# Patient Record
Sex: FEMALE | Race: WHITE | Employment: FULL TIME | URBAN - METROPOLITAN AREA
[De-identification: names, ages, dates, MRNs, and addresses within clinical notes are randomized per-mention and may not be internally consistent; named-entity substitution may affect disease eponyms.]

---

## 2021-09-29 ENCOUNTER — OFFICE VISIT (OUTPATIENT)
Dept: OBGYN | Age: 31
End: 2021-09-29
Payer: COMMERCIAL

## 2021-09-29 ENCOUNTER — HOSPITAL ENCOUNTER (OUTPATIENT)
Age: 31
Setting detail: SPECIMEN
Discharge: HOME | End: 2021-09-29
Payer: COMMERCIAL

## 2021-09-29 VITALS
WEIGHT: 250 LBS | SYSTOLIC BLOOD PRESSURE: 140 MMHG | HEART RATE: 73 BPM | HEIGHT: 63 IN | DIASTOLIC BLOOD PRESSURE: 88 MMHG | BODY MASS INDEX: 44.3 KG/M2

## 2021-09-29 DIAGNOSIS — N92.1 MENORRHAGIA WITH IRREGULAR CYCLE: ICD-10-CM

## 2021-09-29 DIAGNOSIS — Z01.419 WOMEN'S ANNUAL ROUTINE GYNECOLOGICAL EXAMINATION: Primary | ICD-10-CM

## 2021-09-29 LAB
BASOPHILS ABSOLUTE: 0.1 K/UL (ref 0–0.2)
BASOPHILS RELATIVE PERCENT: 0.7 %
EOSINOPHILS ABSOLUTE: 0.6 K/UL (ref 0–0.6)
EOSINOPHILS RELATIVE PERCENT: 6.7 %
FOLLICLE STIMULATING HORMONE: 5.7 MIU/ML
HCT VFR BLD CALC: 48.6 % (ref 36–48)
HEMOGLOBIN: 15.8 G/DL (ref 12–16)
LUTEINIZING HORMONE: 15 MIU/ML
LYMPHOCYTES ABSOLUTE: 3.2 K/UL (ref 1–5.1)
LYMPHOCYTES RELATIVE PERCENT: 34.2 %
MCH RBC QN AUTO: 26.7 PG (ref 26–34)
MCHC RBC AUTO-ENTMCNC: 32.6 G/DL (ref 31–36)
MCV RBC AUTO: 82.1 FL (ref 80–100)
MONOCYTES ABSOLUTE: 0.5 K/UL (ref 0–1.3)
MONOCYTES RELATIVE PERCENT: 5.1 %
NEUTROPHILS ABSOLUTE: 4.9 K/UL (ref 1.7–7.7)
NEUTROPHILS RELATIVE PERCENT: 53.3 %
PDW BLD-RTO: 14.1 % (ref 12.4–15.4)
PLATELET # BLD: 255 K/UL (ref 135–450)
PMV BLD AUTO: 9 FL (ref 5–10.5)
PROLACTIN: 8.2 NG/ML
RBC # BLD: 5.92 M/UL (ref 4–5.2)
TSH REFLEX: 0.73 UIU/ML (ref 0.27–4.2)
WBC # BLD: 9.3 K/UL (ref 4–11)

## 2021-09-29 PROCEDURE — 87801 DETECT AGNT MULT DNA AMPLI: CPT

## 2021-09-29 PROCEDURE — 87624 HPV HI-RISK TYP POOLED RSLT: CPT

## 2021-09-29 PROCEDURE — 99385 PREV VISIT NEW AGE 18-39: CPT | Performed by: OBSTETRICS & GYNECOLOGY

## 2021-09-29 PROCEDURE — 88142 CYTOPATH C/V THIN LAYER: CPT

## 2021-09-29 PROCEDURE — 36415 COLL VENOUS BLD VENIPUNCTURE: CPT | Performed by: OBSTETRICS & GYNECOLOGY

## 2021-09-29 RX ORDER — SERTRALINE HYDROCHLORIDE 100 MG/1
100 TABLET, FILM COATED ORAL DAILY
COMMUNITY

## 2021-09-29 RX ORDER — MULTIVIT-MIN/IRON/FOLIC ACID/K 18-600-40
CAPSULE ORAL
COMMUNITY

## 2021-09-29 SDOH — ECONOMIC STABILITY: FOOD INSECURITY: WITHIN THE PAST 12 MONTHS, THE FOOD YOU BOUGHT JUST DIDN'T LAST AND YOU DIDN'T HAVE MONEY TO GET MORE.: NEVER TRUE

## 2021-09-29 SDOH — ECONOMIC STABILITY: FOOD INSECURITY: WITHIN THE PAST 12 MONTHS, YOU WORRIED THAT YOUR FOOD WOULD RUN OUT BEFORE YOU GOT MONEY TO BUY MORE.: NEVER TRUE

## 2021-09-29 ASSESSMENT — SOCIAL DETERMINANTS OF HEALTH (SDOH): HOW HARD IS IT FOR YOU TO PAY FOR THE VERY BASICS LIKE FOOD, HOUSING, MEDICAL CARE, AND HEATING?: NOT HARD AT ALL

## 2021-09-29 ASSESSMENT — PATIENT HEALTH QUESTIONNAIRE - PHQ9
1. LITTLE INTEREST OR PLEASURE IN DOING THINGS: 0
SUM OF ALL RESPONSES TO PHQ QUESTIONS 1-9: 0
SUM OF ALL RESPONSES TO PHQ9 QUESTIONS 1 & 2: 0
2. FEELING DOWN, DEPRESSED OR HOPELESS: 0

## 2021-09-29 NOTE — PROGRESS NOTES
21    Guillermo Connell  1990  As she had regular cycles. Chief Complaint   Patient presents with   [facility] Gynecologic Exam     Annual exam, sexually active, LMP 9/3/21 that lasted until 21, prior to that some spotting off and on x6 mths, pt desires to conceive x 8yrs. pap 2015 negative . ijeoma Connell is a 28 y.o. female who presents today for evaluation of irregular periods and infertility. She states she has been trying to conceive for 8 years. Her partner has fathered children. She is also had a longstanding history of irregular cycles. She states at no point in her life as she had regular cycles. Past Medical History:   Diagnosis Date    Anxiety     Anxiety     Infertility associated with anovulation     Irregular menses     Obesity     PCOS (polycystic ovarian syndrome)     PCOS (polycystic ovarian syndrome)        Past Surgical History:   Procedure Laterality Date    HAND SURGERY         Social History     Tobacco Use    Smoking status: Current Every Day Smoker     Packs/day: 0.50     Types: Cigarettes    Smokeless tobacco: Former User   Vaping Use    Vaping Use: Never used   Substance Use Topics    Alcohol use: Yes     Comment: social    Drug use: No       Family History   Problem Relation Age of Onset    Breast Cancer Paternal Grandmother     Hypertension Father        Current Outpatient Medications   Medication Sig Dispense Refill    sertraline (ZOLOFT) 100 MG tablet Take 100 mg by mouth daily      Cholecalciferol (VITAMIN D) 50 MCG (2000) CAPS capsule Take by mouth       No current facility-administered medications for this visit. Allergies   Allergen Reactions    Bactrim [Sulfamethoxazole-Trimethoprim] Hives           Immunization History   Administered Date(s) Administered    COVID-19, Pfizer, PF, 30mcg/0.3mL 2021, 2021       Review of Systems   All other systems reviewed and are negative.       BP (!) 140/88   Pulse 73   Ht 5' 3\" (1.6 m)   Wt 250 lb (113.4 kg)   LMP 09/03/2021 (Exact Date)   BMI 44.29 kg/m²     Physical Exam  Nursing note reviewed. HENT:      Head: Normocephalic. Nose: Nose normal.   Eyes:      Extraocular Movements: Extraocular movements intact. Pulmonary:      Effort: Pulmonary effort is normal.   Abdominal:      General: Abdomen is flat. Palpations: Abdomen is soft. Musculoskeletal:      Cervical back: Normal range of motion. Skin:     General: Skin is warm. Neurological:      Mental Status: She is alert. No results found for this visit on 09/29/21. ASSESSMENT AND PLAN   Diagnosis Orders   1. Women's annual routine gynecological examination  CBC Auto Differential    Vaginal Pathogens Probes *A    DHEA-Sulfate    Insulin, total    Prolactin    TSH with Reflex    Sex Hormone Binding Globulin    Testosterone, free, total    Follicle Stimulating Hormone    Luteinizing Hormone    PAP SMEAR    C.trachomatis N.gonorrhoeae DNA, Thin Prep   2. Menorrhagia with irregular cycle  CBC Auto Differential    Vaginal Pathogens Probes *A    DHEA-Sulfate    Insulin, total    Prolactin    TSH with Reflex    Sex Hormone Binding Globulin    Testosterone, free, total    Follicle Stimulating Hormone    Luteinizing Hormone    PAP SMEAR    C.trachomatis N.gonorrhoeae DNA, Thin Prep    US NON OB TRANSVAGINAL       Return in about 1 week (around 10/6/2021) for follow up appointment, ultrasound.     Gabi Morales MD

## 2021-09-30 LAB
CANDIDA SPECIES, DNA PROBE: NORMAL
GARDNERELLA VAGINALIS, DNA PROBE: NORMAL
TRICHOMONAS VAGINALIS DNA: NORMAL

## 2021-10-01 LAB
DHEAS (DHEA SULFATE): 136 UG/DL (ref 45–270)
INSULIN COMMENT: NORMAL
INSULIN REFERENCE RANGE:: NORMAL
INSULIN: 31.6 MU/L
SEX HORMONE BINDING GLOBULIN: 28 NMOL/L (ref 30–135)
SEX HORMONE BINDING GLOBULIN: 28 NMOL/L (ref 30–135)
TESTOSTERONE FREE-NONMALE: 10.9 PG/ML (ref 0.8–7.4)
TESTOSTERONE TOTAL: 55 NG/DL (ref 20–70)

## 2021-10-05 LAB
CHLAMYDIA BY THIN PREP: NEGATIVE
GC BY THIN PREP: NEGATIVE
HPV HIGH RISK: NOT DETECTED
HPV, GENOTYPE 16: NOT DETECTED
HPV, GENOTYPE 18: NOT DETECTED

## 2021-10-13 ENCOUNTER — OFFICE VISIT (OUTPATIENT)
Dept: OBGYN | Age: 31
End: 2021-10-13
Payer: COMMERCIAL

## 2021-10-13 VITALS
WEIGHT: 250 LBS | DIASTOLIC BLOOD PRESSURE: 84 MMHG | BODY MASS INDEX: 44.3 KG/M2 | HEIGHT: 63 IN | HEART RATE: 76 BPM | SYSTOLIC BLOOD PRESSURE: 135 MMHG

## 2021-10-13 DIAGNOSIS — N92.1 MENORRHAGIA WITH IRREGULAR CYCLE: Primary | ICD-10-CM

## 2021-10-13 DIAGNOSIS — E28.2 PCOS (POLYCYSTIC OVARIAN SYNDROME): ICD-10-CM

## 2021-10-13 PROCEDURE — 99214 OFFICE O/P EST MOD 30 MIN: CPT | Performed by: OBSTETRICS & GYNECOLOGY

## 2021-10-13 RX ORDER — MEDROXYPROGESTERONE ACETATE 10 MG/1
10 TABLET ORAL DAILY
Qty: 30 TABLET | Refills: 3 | Status: SHIPPED | OUTPATIENT
Start: 2021-10-13 | End: 2021-10-23

## 2021-10-13 NOTE — PROGRESS NOTES
10/13/21    Angy Orellana  1990    Chief Complaint   Patient presents with    Follow-up     had u/s     Menorrhagia    Menstrual Problem        Angy Orellana is a 28 y.o. female who presents today for evaluation of her irregular cycles and desire to conceive. Ultrasound and labs were reviewed with the patient. Patient reports only having 2 cycles this year so far. No other complaints or problems. Past Medical History:   Diagnosis Date    Anxiety     Anxiety     Infertility associated with anovulation     Irregular menses     Obesity     PCOS (polycystic ovarian syndrome)     PCOS (polycystic ovarian syndrome)        Past Surgical History:   Procedure Laterality Date    HAND SURGERY         Social History     Tobacco Use    Smoking status: Current Every Day Smoker     Packs/day: 0.50     Types: Cigarettes    Smokeless tobacco: Former User   Vaping Use    Vaping Use: Never used   Substance Use Topics    Alcohol use: Yes     Comment: social    Drug use: No       Family History   Problem Relation Age of Onset    Breast Cancer Paternal Grandmother     Hypertension Father        Current Outpatient Medications   Medication Sig Dispense Refill    medroxyPROGESTERone (PROVERA) 10 MG tablet Take 1 tablet by mouth daily for 10 days 30 tablet 3    clomiPHENE (CLOMID) 50 MG tablet Take 1 tablet by mouth daily On days 3-7 of cycle 30 tablet 3    sertraline (ZOLOFT) 100 MG tablet Take 100 mg by mouth daily      Cholecalciferol (VITAMIN D) 50 MCG (2000 UT) CAPS capsule Take by mouth       No current facility-administered medications for this visit. Allergies   Allergen Reactions    Bactrim [Sulfamethoxazole-Trimethoprim] Hives           Immunization History   Administered Date(s) Administered    COVID-19, Pfizer, PF, 30mcg/0.3mL 2021, 2021       Review of Systems   All other systems reviewed and are negative.       /84   Pulse 76   Ht 5' 3\" (1.6 m)   Wt 250 lb (113.4 kg)   BMI 44.29 kg/m²     Physical Exam    No results found for this visit on 10/13/21. ASSESSMENT AND PLAN   Diagnosis Orders   1. Menorrhagia with irregular cycle     2. PCOS (polycystic ovarian syndrome)       Prescriptions for Provera 10 mg daily for 10 days. She was also given a prescription for Clomid 50 mg 1 p.o. daily on days 3 through 7. Ovulation induction information sheet was given to the patient. She was instructed to follow-up with pregnancy or in 4 months. Return in about 4 months (around 2/13/2022) for follow up appointment.     Tani Murray MD

## 2022-02-16 ENCOUNTER — OFFICE VISIT (OUTPATIENT)
Dept: OBGYN | Age: 32
End: 2022-02-16
Payer: COMMERCIAL

## 2022-02-16 VITALS
SYSTOLIC BLOOD PRESSURE: 131 MMHG | DIASTOLIC BLOOD PRESSURE: 82 MMHG | WEIGHT: 248 LBS | HEIGHT: 63 IN | BODY MASS INDEX: 43.94 KG/M2 | HEART RATE: 99 BPM

## 2022-02-16 DIAGNOSIS — E28.2 PCOS (POLYCYSTIC OVARIAN SYNDROME): Primary | ICD-10-CM

## 2022-02-16 PROCEDURE — 99213 OFFICE O/P EST LOW 20 MIN: CPT | Performed by: OBSTETRICS & GYNECOLOGY

## 2022-02-16 NOTE — PROGRESS NOTES
22    Kathia Garcia  1990    Chief Complaint   Patient presents with    Menstrual Problem     pt has PCOS trying to conceive, LMP 22 states her menses in . is the first one she got without taking the provera . on clomid . ijeoma Garcia is a 33 y.o. female who presents today for evaluation of PCOS and irregular cycles. She is currently on Clomid. She states her home ovulation predictor kits are turning positive and she had a spontaneous cycle last month. Past Medical History:   Diagnosis Date    Anxiety     Anxiety     Infertility associated with anovulation     Irregular menses     Obesity     PCOS (polycystic ovarian syndrome)     PCOS (polycystic ovarian syndrome)        Past Surgical History:   Procedure Laterality Date    HAND SURGERY         Social History     Tobacco Use    Smoking status: Current Every Day Smoker     Packs/day: 0.50     Types: Cigarettes    Smokeless tobacco: Former User   Vaping Use    Vaping Use: Never used   Substance Use Topics    Alcohol use: Yes     Comment: social    Drug use: No       Family History   Problem Relation Age of Onset    Breast Cancer Paternal Grandmother     Hypertension Father        Current Outpatient Medications   Medication Sig Dispense Refill    clomiPHENE (CLOMID) 50 MG tablet Take 1 tablet by mouth daily On days 3-7 of cycle 30 tablet 3    sertraline (ZOLOFT) 100 MG tablet Take 100 mg by mouth daily      Cholecalciferol (VITAMIN D) 50 MCG ( UT) CAPS capsule Take by mouth      medroxyPROGESTERone (PROVERA) 10 MG tablet Take 1 tablet by mouth daily for 10 days 30 tablet 3     No current facility-administered medications for this visit.        Allergies   Allergen Reactions    Bactrim [Sulfamethoxazole-Trimethoprim] Hives           Immunization History   Administered Date(s) Administered    COVID-19, Pfizer Purple top, DILUTE for use, 12+ yrs, 30mcg/0.3mL dose 2021, 2021, 2021 Review of Systems  All other systems reviewed and are negative    /82   Pulse 99   Ht 5' 3\" (1.6 m)   Wt 248 lb (112.5 kg)   LMP 01/24/2022   BMI 43.93 kg/m²     Physical Exam    No results found for this visit on 02/16/22. ASSESSMENT AND PLAN   Diagnosis Orders   1. PCOS (polycystic ovarian syndrome)       Patient will continue on Clomid for four more cycles. If she is not pregnant she will return for hysterosalpingogram and semen analysis. She will call and return if she has a positive pregnancy test.  Return if symptoms worsen or fail to improve.     Ana Cristina Park MD

## 2022-07-18 ENCOUNTER — HOSPITAL ENCOUNTER (OUTPATIENT)
Dept: MRI IMAGING | Age: 32
Discharge: HOME | End: 2022-07-18
Payer: COMMERCIAL

## 2022-07-18 DIAGNOSIS — S83.91XA SPRAIN OF UNSPECIFIED SITE OF RIGHT KNEE, INITIAL ENCOUNTER: ICD-10-CM

## 2022-07-18 PROCEDURE — 73721 MRI JNT OF LWR EXTRE W/O DYE: CPT

## 2022-07-23 ENCOUNTER — HOSPITAL ENCOUNTER (OUTPATIENT)
Dept: PHYSICAL THERAPY | Age: 32
Setting detail: THERAPIES SERIES
Discharge: HOME | End: 2022-07-23
Payer: COMMERCIAL

## 2022-07-23 PROCEDURE — 97110 THERAPEUTIC EXERCISES: CPT

## 2022-07-23 PROCEDURE — 97161 PT EVAL LOW COMPLEX 20 MIN: CPT

## 2022-07-23 PROCEDURE — 97535 SELF CARE MNGMENT TRAINING: CPT

## 2022-07-23 PROCEDURE — 97112 NEUROMUSCULAR REEDUCATION: CPT

## 2022-07-23 ASSESSMENT — PAIN SCALES - GENERAL: PAINLEVEL_OUTOF10: 2

## 2022-07-23 ASSESSMENT — PAIN - FUNCTIONAL ASSESSMENT: PAIN_FUNCTIONAL_ASSESSMENT: PREVENTS OR INTERFERES SOME ACTIVE ACTIVITIES AND ADLS

## 2022-07-23 ASSESSMENT — PAIN DESCRIPTION - LOCATION: LOCATION: KNEE

## 2022-07-23 ASSESSMENT — PAIN DESCRIPTION - ORIENTATION: ORIENTATION: RIGHT

## 2022-07-23 ASSESSMENT — PAIN DESCRIPTION - DESCRIPTORS: DESCRIPTORS: TIGHTNESS

## 2022-07-23 ASSESSMENT — PAIN DESCRIPTION - PAIN TYPE: TYPE: CHRONIC PAIN

## 2022-07-23 ASSESSMENT — PAIN DESCRIPTION - FREQUENCY: FREQUENCY: INTERMITTENT

## 2022-07-23 NOTE — FLOWSHEET NOTE
from physical therapy to address deficits limiting mobility and function. Subjective:  See eval         Any changes in Ambulatory Summary Sheet? None        Objective:  See eval   COVID screening questions were asked and patient attested that there had been no contact or symptoms        Exercises: (No more than 4 columns)   Exercise/Equipment Date 7/23/22  #1 Date Date           WARM UP                     TABLE      *Quad sets 1 x 10  Vc/tc     *SLR 1 x 10  vc     *Hip add yan 1 x 10                    STANDING      PREs wbing/closed chain                                               PROPRIOCEPTION                                    MODALITIES prn                      Other Therapeutic Activities/Education:    POC and treatment rationale/progression expected reviewed w/patient. Home Exercise Program:    See above ()    Manual Treatments:    NO    Modalities:    NO    Communication with other providers:    POC faxed 7/23/22    Assessment:  (Response towards treatment session) (Pain Rating)  Assessment: Pt is a 33 y.o. patient who reports twisting her knee while using a antonino lift at work back in April. Per pt, there has been some improvement, but still gives her pain. WORSE:  Standing/walking for a long period, swells by night time, kneeling. BETTER:  In am and when wearing brace. Pain and swelling are intermittent. PLOF:  No knee issues, independent w/all mobility, self-care, work w/o problem. CURRENT OF LOF:  R knee pain worsening through the day, swells, difficult to walk/stand for long periods of time. Patient will benefit from physical therapy to address deficits limiting mobility and function.       Plan for Next Session:   Specific Instructions for Next Treatment: STM, KT tape for PF syndrome, quad/VMO strengthening, modalities prn, HEP    Time In / Time Out:    5600/8290       If [facility] Please Indicate Time In/Out/Total Time  CPT Code Time in Time out Total Time   83018  0800  0815  [address]  10                        Total for session      55       Timed Code/Total Treatment Minutes:  40'/55'  TE 13' (1), NRE 13' (1), ADL 10' (1)      Next Progress Note due:  8/23/22      Plan of Care Interventions:  [x] Therapeutic Exercise  [x] Modalities:  [x] Therapeutic Activity     [x] Ultrasound  [x] Estim  [] Gait Training      [] Cervical Traction [] Lumbar Traction  [x] Neuromuscular Re-education    [x] Cold/hotpack [] Iontophoresis   [x] Instruction in HEP      [x] Vasopneumatic   [] Dry Needling    [x] Manual Therapy               [] Aquatic Therapy              Electronically signed by:  Darin Sanchez, PT, 7/23/2022, 2:59 PM

## 2022-07-23 NOTE — PROGRESS NOTES
Physical Therapy  Physical Therapy: Initial Evaluation    Patient: Donte Avila (24 y.o. female)   Examination Date:   Plan of Care Certification Period: 2022 to  22      :  1990 ;    Confirmed: Yes MRN: [id number]  CSN: [id number]   Insurance: Payor: Kai Wren / Plan: [place] [id number] / Product Type: *No Product type* /   Insurance ID: [id number] - (Worker's Comp) Secondary Insurance (if applicable):    Referring Physician: FRANCIS Howard   PCP: Bo Perez MD Visits to Date/Visits Approved:   1/ 10      No Show/Cancelled Appts:   /       Medical Diagnosis: Sprain of unspecified site of right knee, initial encounter [S83.91XA] R knee sprain  Treatment Diagnosis: L knee pain, impaired function     PERTINENT MEDICAL HISTORY   Patient Assessed for Rehabilitation Services: Yes  Self reported health status[de-identified] Fair    Medical History: Chart Reviewed: Yes   Past Medical History:   Diagnosis Date    Anxiety     Anxiety     Infertility associated with anovulation     Irregular menses     Obesity     PCOS (polycystic ovarian syndrome)     PCOS (polycystic ovarian syndrome)      Surgical History:   Past Surgical History:   Procedure Laterality Date    HAND SURGERY         Medications:   Current Outpatient Medications:     medroxyPROGESTERone (PROVERA) 10 MG tablet, Take 1 tablet by mouth daily for 10 days, Disp: 30 tablet, Rfl: 3    clomiPHENE (CLOMID) 50 MG tablet, Take 1 tablet by mouth daily On days 3-7 of cycle, Disp: 30 tablet, Rfl: 3    sertraline (ZOLOFT) 100 MG tablet, Take 100 mg by mouth daily, Disp: , Rfl:     Cholecalciferol (VITAMIN D) 50 MCG (2000) CAPS capsule, Take by mouth, Disp: , Rfl:   Allergies: Bactrim [sulfamethoxazole-trimethoprim]      SUBJECTIVE EXAMINATION     History obtained from[de-identified] Patient,      Family/Caregiver Present: No    Subjective History:  Onset Date: 22  Subjective: Pt is a 33 y.o. patient who reports twisting her knee while using a antonino lift at work back in April. Per pt, there has been some improvement, but still gives her pain. WORSE:  Standing/walking for a long period, swells by night time, kneeling. BETTER:  In am and when wearing brace. Pain and swelling are intermittent. PLOF:  No knee issues, independent w/all mobility, self-care, work w/o problem. CURRENT OF LOF:  R knee pain worsening through the day, swells, difficult to walk/stand for long periods of time.   Additional Pertinent Hx (if applicable): PMH:  anxiety, obesity   Prior diagnostic testing[de-identified] MRI  Previous treatments prior to current episode?: Chiropractor  Comment: No latex allergery as far as she knows      Learning/Language: Learning  Does the patient/guardian have any barriers to learning?: No barriers  Will there be a co-learner?: No  What is the preferred language of the patient/guardian?: English     Pain Screening   Pain Screening  Patient Currently in Pain: Yes  Pain Assessment: 0-10  Pain Level: 2  Best Pain Level: 0  Worst Pain Level: 7  Patient's Stated Pain Goal: 0 - No pain  Pain Type: Chronic pain  Pain Location: Knee  Pain Orientation: Right  Pain Descriptors: Tightness  Pain Frequency: Intermittent  Functional Pain Assessment: Prevents or interferes some active activities and ADLs  Aggravating factors: Walking, Standing, Kneeling  Pain Management/Relieving Factors: Rest, Ice, Sitting    Functional Status    Dominant Hand: : Right    Social History:  Social History  Lives With: Spouse  Type of Home: House  Home Layout: One level, Laundry in basement  Home Access:  (5 dionne w/rail)    Occupation/Interests:  Occupation: Full time employment  Type of Occupation: [employer] at [employer]: Prolonged standing, Moderate lifting    Prior Level of Function:  Ind w/mobility          Current Level of Function:  Ind w/mobility knee pain      ADL Assistance: [facility]: Independent  Homemaking Responsibilities: Yes  Ambulation Assistance: Independent  Transfer Assistance: Independent  Active : Yes  Mode of Transportation: Car    OBJECTIVE EXAMINATION   Restrictions:        Position Activity Restriction  Other position/activity restrictions: No formal restrictions    Review of Systems:  Vision: Within Functional Limits  Hearing: Within functional limits  Patient affect[de-identified] Normal  Follows Commands: Within Functional Limits    VBI Screening / Lumbar Screening:        Regional Screen:         Observations:  General Observations  General Observations: Yes  Description: gait w/o AD, non-antalgic, able to ascend/descend stairs    Palpation:   Right Knee Palpation: TTP infero-medial border of patella, soft tissue snapping w/med and lateral rocking of patells    Ambulation/Gait (if applicable):  Ambulation  Surface: level tile, carpet  Device: No Device  Assistance: Independent  Gait Deviations: None  Distance: 100 ft x 2  Stairs/Curb  Stairs?: Yes  Stairs  # Steps : 11  Stairs Height: 6\"  Assistance: Modified independent   Comment: reciprocol, non-antalgic    Left LE AROM  Right LE AROM      General AROM LE: Left WFL (genu valgus bilat)    General AROM LE: Left WFL (genu valgus bilat)  AROM RLE (degrees)  R Knee Flexion 0-145: 0-130  R Knee Extension 0: 0     Left PROM  Right PROM                    Left Strength  Right Strength         Strength LLE  L Hip Flexion: 5/5  L Hip Extension: 5/5  L Knee Flexion: 5/5  L Knee Extension: 5/5    Strength RLE  R Hip Flexion: 4+/5  R Hip Extension: 4+/5  R Knee Flexion: 4/5  R Knee Extension: 4/5     Special Tests:   Special Tests for Knee  Fisher's (chondromalacia Patellae): R (-) (however, apprehensive)  Valgus Stress Test (MCL injury): R (-)  Varus Stress Test (LCL injury): R (-)  Posterior Draw Test (PCL injury): R (-)  Ant.  Drawer (ACL injury): R (-)  Jermaine (Meniscus Lesion): R (-)    Additional Finding(s) (if applicable):    KOOS = 3/67 (32% disability)       ASSESSMENT Impression: Assessment: Pt is a 33 y.o. patient who reports twisting her knee while using a antonino lift at work back in April. Per pt, there has been some improvement, but still gives her pain. WORSE:  Standing/walking for a long period, swells by night time, kneeling. BETTER:  In am and when wearing brace. Pain and swelling are intermittent. PLOF:  No knee issues, independent w/all mobility, self-care, work w/o problem. CURRENT OF LOF:  R knee pain worsening through the day, swells, difficult to walk/stand for long periods of time. Patient will benefit from physical therapy to address deficits limiting mobility and function. Body Structures, Functions, Activity Limitations Requiring Skilled Therapeutic Intervention: Decreased functional mobility , Decreased ROM, Decreased tolerance to work activity, Decreased strength, Increased pain, Decreased ADL status    Statement of Medical Necessity: Physical Therapy is both indicated and medically necessary as outlined in the POC to increase the likelihood of meeting the functionally related goals stated below. Patient's Activity Tolerance: Patient tolerated evaluation without incident      Patient's rehabilitation potential/prognosis is considered to be: Good    Factors which may impact rehabilitation potential include: None        GOALS   Patient Goal(s): To have no knee pain return to normal activities/increase activity w/o pain    Long Term Goals Completed by In 10 visits, patient will Goal Status   demonstrate compliance and independence w/mobility and symptom management. score at <= 10% disability on KOOS as indication of improved function w/daily activities. increase RLE strength to 5/5 for improved function w/wbing activities. demonstrate full R knee AROM w/o restriction or pain.                                               TREATMENT PLAN   PT Equipment Recommendations  Equipment Needed: No   Requires PT Follow-Up: Yes  Treatment Initiated : see flow sheet  Specific Instructions for Next Treatment: STM, KT tape for PF syndrome, quad/VMO strengthening, modalities prn, HEP    Pt. actively involved in establishing Plan of Care and Goals: Yes  Patient/ Caregiver education and instruction: Goals, PT Role, Plan of Care, Evaluative findings             Treatment may include any combination of the following: Strengthening, ROM, Manual Therapy - Joint Manipulation, Manual Therapy - Soft Tissue Mobilization, Neuromuscular re-education, Pain management, Home exercise program, Patient/Caregiver education & training, Therapeutic activities, Integrated dry needling, Modalities     Frequency / Duration:  Patient to be seen 2-3x/wk for up to 10 visits for 4-5 weeks weeks      Eval Complexity: Overall Evaluation : Low  Decision Making: Low Complexity  History: Personal Factors and/or Comorbidities Impacting POC: Low  History: see above  Examination of body system(s) including body structures and functions, activity limitations, and/or participation restrictions: Low  Exam: see above  Clinical Presentation: Low        Therapist Signature: Wilmer Ramírez PT    Date: 1/60/9084     I certify that the above Therapy Services are being furnished while the patient is under my care. I agree with the treatment plan and certify that this therapy is necessary. [de-identified] Signature:  ___________________________   Date:_______                                                                   FaJAGDEEP Kurtz*        Physician Comments: _______________________________________________    Please sign and return to [address] [place] [address]. Please fax to the location listed below.  Camilla Blum for this referral!    [address] [facility] [address], [address] [address]  Dept: [phone]  Loc: [phone]

## 2022-08-05 ENCOUNTER — HOSPITAL ENCOUNTER (OUTPATIENT)
Dept: PHYSICAL THERAPY | Age: 32
Setting detail: THERAPIES SERIES
Discharge: HOME | End: 2022-08-05
Payer: COMMERCIAL

## 2022-08-05 PROCEDURE — 97110 THERAPEUTIC EXERCISES: CPT | Performed by: PHYSICAL THERAPY ASSISTANT

## 2022-08-05 NOTE — FLOWSHEET NOTE
Outpatient Physical Therapy  [place]           [x] Phone: [phone]   Fax: [phone]  [place]           [] Phone: [phone]   Fax: [phone]        Physical Therapy Daily Treatment Note  Date:  2022    Patient Name:  Cecilio Aguilar    :  1990  MRN: [id number]  Restrictions/Precautions: No data recorded   Position Activity Restriction  Other position/activity restrictions: No formal restrictions  Diagnosis:   Sprain of unspecified site of right knee, initial encounter [facility] Kemp  Date of Injury/Surgery: 22  Treatment Diagnosis:  L knee pain, impaired function  Insurance/Certification information: [facility]  Referring Physician:  FRANCIS Horn   PCP: Juli Bryson MD  Next Doctor Visit:  Unknown  Plan of care signed (Y/N):  pending  Outcome Measure: KOOS 32%  Visit# / total visits:   2/10  Pain level: 1/10   Goals:     Patient goals: To have no knee pain return to normal activities/increase activity w/o pain     Long Term Goals  Time Frame for Long Term Goals: In 10 visits, patient will  demonstrate compliance and independence w/mobility and symptom management. score at <= 10% disability on KOOS as indication of improved function w/daily activities. increase RLE strength to 5/5 for improved function w/wbing activities. demonstrate full R knee AROM w/o restriction or pain. Summary of Evaluation:  Assessment: Pt is a 33 y.o. patient who reports twisting her knee while using a antonino lift at work back in April. Per pt, there has been some improvement, but still gives her pain. WORSE:  Standing/walking for a long period, swells by night time, kneeling. BETTER:  In am and when wearing brace. Pain and swelling are intermittent. PLOF:  No knee issues, independent w/all mobility, self-care, work w/o problem. CURRENT OF LOF:  R knee pain worsening through the day, swells, difficult to walk/stand for long periods of time.   Patient will benefit from physical therapy to address deficits limiting mobility and function. Subjective:  Pt stated she has not been wearing the brace. Pt likes to hike and she would like to do this without pain. HEP: no problems with the ones given. After work the pt ices the knee regularly. Any changes in Ambulatory Summary Sheet? None        Objective:  COVID screening questions were asked and patient attested that there had been no contact or symptoms        Exercises: (No more than 4 columns)   Exercise/Equipment Date 7/23/22  #1 Date  8/5/22 #2 Date           WARM UP                     TABLE      *Quad sets 1 x 10  Vc/tc 10x1    *SLR 1 x 10  vc 10x1    *Hip add yan 1 x 10 10x1                   STANDING      1/2 foam heel raises  10x2    Heel tap  4\" 10x1 both sides    Step up, down fwd, hip ER to step and return ER to floor  10x1 each direction right. Hip cybex   37.5#, hip abd. 10x1    Shuttle leg press  4C, dbl. 10x3   3C, R / L  single,     FM lateral monster step  17#, handle, x 5 each side               PROPRIOCEPTION      Foam tandem  2x30\" each direction. MODALITIES prn      Game ready  yes              Other Therapeutic Activities/Education:    POC and treatment rationale/progression expected reviewed w/patient. Home Exercise Program:    See above ()    Manual Treatments:    NO    Modalities:    NO    Communication with other providers:    POC faxed 7/23/22    Assessment:  (Response towards treatment session) (Pain Rating)  Assessment: Pt is a 33 y.o. patient who reports twisting her knee while using a antonino lift at work back in April. Per pt, there has been some improvement, but still gives her pain. WORSE:  Standing/walking for a long period, swells by night time, kneeling. BETTER:  In am and when wearing brace. Pain and swelling are intermittent. PLOF:  No knee issues, independent w/all mobility, self-care, work w/o problem.   CURRENT OF LOF:  R knee pain worsening through the day, swells, difficult to walk/stand for long periods of time. Patient will benefit from physical therapy to address deficits limiting mobility and function. Plan for Next Session:   Specific Instructions for Next Treatment: STM, KT tape for PF syndrome, quad/VMO strengthening, modalities prn, HEP    Time In / Time Out:    2295/1154     If [facility] Please Indicate Time In/Out/Total Time  CPT Code Time in Time out Total Time   23773         03296  8642  4416  34   [address] [facility]  0086  9053  76   90218                              Total for session      30       Timed Code/Total Treatment Minutes:  30'/40'  (30) TE, (10') cold pack Not billed. Next Progress Note due:  8/23/22      Plan of Care Interventions:  [x] Therapeutic Exercise  [x] Modalities:  [x] Therapeutic Activity     [x] Ultrasound  [x] Estim  [] Gait Training      [] Cervical Traction [] Lumbar Traction  [x] Neuromuscular Re-education    [x] Cold/hotpack [] Iontophoresis   [x] Instruction in HEP      [x] Vasopneumatic   [] Dry Needling    [x] Manual Therapy               [] Aquatic Therapy              Electronically signed by:   Hailey Madrigal PTA 8/5/2022, 9:53 AM

## 2022-08-10 ENCOUNTER — HOSPITAL ENCOUNTER (OUTPATIENT)
Dept: PHYSICAL THERAPY | Age: 32
Setting detail: THERAPIES SERIES
Discharge: HOME | End: 2022-08-10
Payer: COMMERCIAL

## 2022-08-10 PROCEDURE — 97110 THERAPEUTIC EXERCISES: CPT

## 2022-08-10 PROCEDURE — 97112 NEUROMUSCULAR REEDUCATION: CPT

## 2022-08-10 PROCEDURE — 97016 VASOPNEUMATIC DEVICE THERAPY: CPT

## 2022-08-10 NOTE — FLOWSHEET NOTE
Outpatient Physical Therapy  [place]           [x] Phone: [phone]   Fax: [phone]  [place]           [] Phone: [phone]   Fax: [phone]        Physical Therapy Daily Treatment Note  Date:  8/10/2022    Patient Name:  Apollo Chan    :  1990  MRN: [id number]  Restrictions/Precautions: No data recorded   Position Activity Restriction  Other position/activity restrictions: No formal restrictions  Diagnosis:   Sprain of unspecified site of right knee, initial encounter [facility]  Date of Injury/Surgery: 22  Treatment Diagnosis:  R knee pain, impaired function  Insurance/Certification information: [facility] 10 visits no end date   Referring Physician:  FRANCIS Davidson   PCP: Larena Schwab, MD  Next Doctor Visit:  Unknown  Plan of care signed (Y/N):  pending  Outcome Measure: KOOS 32%  Visit# / total visits:   3/10  Pain level: 0/10   Goals:     Patient goals: To have no knee pain return to normal activities/increase activity w/o pain     Long Term Goals  Time Frame for Long Term Goals: In 10 visits, patient will  demonstrate compliance and independence w/mobility and symptom management. score at <= 10% disability on KOOS as indication of improved function w/daily activities. increase RLE strength to 5/5 for improved function w/wbing activities. demonstrate full R knee AROM w/o restriction or pain. Summary of Evaluation:  Assessment: Pt is a 33 y.o. patient who reports twisting her knee while using a antonino lift at work back in April. Per pt, there has been some improvement, but still gives her pain. WORSE:  Standing/walking for a long period, swells by night time, kneeling. BETTER:  In am and when wearing brace. Pain and swelling are intermittent. PLOF:  No knee issues, independent w/all mobility, self-care, work w/o problem. CURRENT OF LOF:  R knee pain worsening through the day, swells, difficult to walk/stand for long periods of time. Patient will benefit from physical therapy to address deficits limiting mobility and function. Subjective:  Pt reported no pain upon arrival, but states her R knee felt tight. Pt states compliance w/HEP. Any changes in Ambulatory Summary Sheet? None        Objective:  COVID screening questions were asked and patient attested that there had been no contact or symptoms    Tandem balance on foam easier w/R leg in front than left  More difficulty w/ lateral monster stepping back in than stepping out. Exercises: (No more than 4 columns)   Exercise/Equipment Date 7/23/22  #1 Date  8/5/22 #2 Date  8/10/22 #3           WARM UP                     TABLE      *Quad sets 1 x 10  Vc/tc 10x1 10x1   *SLR 1 x 10  vc 10x1 2x10   *Hip add yan 1 x 10 10x1 2x10                  STANDING      1/2 foam heel raises  10x2 10x2   Heel tap  4\" 10x1 both sides 4\" 10x1 both sides   Step up, down fwd, hip ER to step and return ER to floor  10x1 each direction right. 10x1 each direction right. Hip cybex   37.5#, hip abd. 10x1 42.5# , hip abd. 2x10    Shuttle leg press  4C, dbl. 10x3   3C, R / L  single,  4C, dbl. 10x3   3C, R / L  single,    FM lateral monster step  17#, handle, x 5 each side 17#, handle, x 5 each side              PROPRIOCEPTION      Foam tandem  2x30\" each direction. 2x30\" each direction. MODALITIES prn      Game ready  yes 10'             Other Therapeutic Activities/Education:    POC and treatment rationale/progression expected reviewed w/patient. Home Exercise Program:    See above ()    Manual Treatments:    NO    Modalities:    Vaso on R knee, 10', Pt supine     Communication with other providers:    POC faxed 7/23/22    Assessment:    Pt tolerated today's treatment very well by demonstrating good endurance and understanding of exercises. Pt was able to progress to doing more sets for multiple exercises.  Pt would benefit from additional skilled therapy to improve R LE mobility, balance, and strength deficits.    End pain after vaso: 1/10 R knee      Plan for Next Session:   Specific Instructions for Next Treatment: STM, KT tape for PF syndrome, quad/VMO strengthening, modalities prn, HEP    Time In / Time Out:    2163-4428     If [facility] Please Indicate Time In/Out/Total Time  CPT Code Time in Time out Total Time   18389         69731 SE 2481  8288  20   13162 JQY  3047  2963  70   64766         59503 9764 0140 10                  Total for session      44       Timed Code/Total Treatment Minutes:  34'/44'      1 TE    1 NR      1 Vaso     Next Progress Note due:  8/23/22      Plan of Care Interventions:  [x] Therapeutic Exercise  [x] Modalities:  [x] Therapeutic Activity     [x] Ultrasound  [x] Estim  [] Gait Training      [] Cervical Traction [] Lumbar Traction  [x] Neuromuscular Re-education    [x] Cold/hotpack [] Iontophoresis   [x] Instruction in HEP      [x] Vasopneumatic   [] Dry Needling    [x] Manual Therapy               [] Aquatic Therapy              Electronically signed by:  Kendall Flores PTA 8/10/2022, 10:01 AM  Ce Bailey PTA

## 2022-08-10 NOTE — FLOWSHEET NOTE
Patients Plan of Care was received and signed. Signed POC was scanned and placed in the patients chart.     Jodie Mitchell

## 2022-08-18 ENCOUNTER — HOSPITAL ENCOUNTER (OUTPATIENT)
Dept: PHYSICAL THERAPY | Age: 32
Setting detail: THERAPIES SERIES
Discharge: HOME | End: 2022-08-18
Payer: COMMERCIAL

## 2022-08-18 PROCEDURE — 97110 THERAPEUTIC EXERCISES: CPT

## 2022-08-18 PROCEDURE — 97016 VASOPNEUMATIC DEVICE THERAPY: CPT

## 2022-08-18 PROCEDURE — 97112 NEUROMUSCULAR REEDUCATION: CPT

## 2022-08-18 NOTE — FLOWSHEET NOTE
Outpatient Physical Therapy  [place]           [x] Phone: [phone]   Fax: [phone]  [place]           [] Phone: [phone]   Fax: [phone]        Physical Therapy Daily Treatment Note  Date:  2022    Patient Name:  Damaris Velazquez    :  1990  MRN: [id number]  Restrictions/Precautions: No data recorded   Position Activity Restriction  Other position/activity restrictions: No formal restrictions  Diagnosis:   Sprain of unspecified site of right knee, initial encounter Roxi Alamo  Date of Injury/Surgery: 22  Treatment Diagnosis:  R knee pain, impaired function  Insurance/Certification information: [facility] 10 visits no end date   Referring Physician:  FRANCIS Uriostegui   PCP: Mariya Ricks MD  Next Doctor Visit:  Unknown  Plan of care signed (Y/N):  pending  Outcome Measure: KOOS 32%  Visit# / total visits:   4/10  Pain level: 0/10       Goals:     Patient goals: To have no knee pain return to normal activities/increase activity w/o pain     Long Term Goals  Time Frame for Long Term Goals: In 10 visits, patient will  demonstrate compliance and independence w/mobility and symptom management. score at <= 10% disability on KOOS as indication of improved function w/daily activities. increase RLE strength to 5/5 for improved function w/wbing activities. demonstrate full R knee AROM w/o restriction or pain. Summary of Evaluation:  Assessment: Pt is a 33 y.o. patient who reports twisting her knee while using a antonino lift at work back in April. Per pt, there has been some improvement, but still gives her pain. WORSE:  Standing/walking for a long period, swells by night time, kneeling. BETTER:  In am and when wearing brace. Pain and swelling are intermittent. PLOF:  No knee issues, independent w/all mobility, self-care, work w/o problem.   CURRENT OF LOF:  R knee pain worsening through the day, swells, difficult to walk/stand for long periods of time.  Patient will benefit from physical therapy to address deficits limiting mobility and function. Subjective:  Kodak Pelletier arrives to therapy stating that the knee is okay currently no pain. Has increased pain at the end of the day up to a 4/10. Still can't kneel on that knee. The cumulative walking is what causes the increase in her knee pain. Sees doc tomorrow. Any changes in Ambulatory Summary Sheet? None        Objective:  COVID screening questions were asked and patient attested that there had been no contact or symptoms    Cues to avoid locking knee into extension at the top of the range during heel taps. Several LOB during SLB with ball toss. Exercises: (No more than 4 columns)   Exercise/Equipment Date  8/5/22 #2 Date  8/10/22 #3 8/18/2022 #4           WARM UP      Rec Bike S13   5'         TABLE      *Quad sets 10x1 10x1 -   *SLR 10x1 2x10 -   *Hip add yan 10x1 2x10 -                  STANDING      1/2 foam heel raises 10x2 10x2 2*15   Calf stretch    2*30\" (in between raises and after)   Heel tap 4\" 10x1 both sides 4\" 10x1 both sides 6\" 2*10 ea   Step up, down fwd, hip ER to step and return ER to floor 10x1 each direction right. 10x1 each direction right. --   Hip cybex  37.5#, hip abd. 10x1 42.5# , hip abd. 2x10  --   Shuttle leg press 4C, dbl. 10x3   3C, R / L  single,  4C, dbl. 10x3   3C, R / L  single,  3C 2*10 ea LE SL   FM lateral monster step 17#, handle, x 5 each side 17#, handle, x 5 each side 17# handle 5* ea    Sled push/pull       Squat med ball push out             Anterior step up with opposite knee tap    10# MB 6\" step 2*10   Sled push-pull   +25# 3 laps               PROPRIOCEPTION      Foam tandem 2x30\" each direction. 2x30\" each direction.  --   Lunges to BOSU   Ant/Lat 10* ea LE ea dir    BOSU lateral step up/over   10* ea dir    SLB with ball catch    10* small green ball          MODALITIES prn      Game ready yes 10' 10'             Other Therapeutic Activities/Education:    POC and treatment rationale/progression expected reviewed w/patient. Home Exercise Program:    See above ()    Manual Treatments:    NO    Modalities:    Vaso on R knee, 10', Pt supine     Communication with other providers:    POC faxed 7/23/22    Assessment:  Angie tolerated today's session well. She is fairly strong overall  but lacks stability in the knee during more dynamic tasks.  End session pain: 0/10      Plan for Next Session:   Specific Instructions for Next Treatment: STM, KT tape for PF syndrome, quad/VMO strengthening, modalities prn, HEP    Time In / Time Out:    1115/1200     If [facility] Please Indicate Time In/Out/Total Time  CPT Code Time in Time out Total Time   91631         07637 TE 2412 5006 20'   12397 NMR  1135 1150 15'   39246       49993 Vaso  1150 1200 10'                Total for session    45'       Timed Code/Total Treatment Minutes:  35'/45' 1 vaso 10' 1 NR 15' 1 TE 20'    Next Progress Note due:  8/23/22      Plan of Care Interventions:  [x] Therapeutic Exercise  [x] Modalities:  [x] Therapeutic Activity     [x] Ultrasound  [x] Estim  [] Gait Training      [] Cervical Traction [] Lumbar Traction  [x] Neuromuscular Re-education    [x] Cold/hotpack [] Iontophoresis   [x] Instruction in HEP      [x] Vasopneumatic   [] Dry Needling    [x] Manual Therapy               [] Aquatic Therapy              Electronically signed by:  Asher Jay, KOFI 8/18/2022, 9:25 AM

## 2022-08-23 ENCOUNTER — HOSPITAL ENCOUNTER (OUTPATIENT)
Dept: PHYSICAL THERAPY | Age: 32
Setting detail: THERAPIES SERIES
Discharge: HOME | End: 2022-08-23
Payer: COMMERCIAL

## 2022-08-23 PROCEDURE — 97112 NEUROMUSCULAR REEDUCATION: CPT

## 2022-08-23 PROCEDURE — 97110 THERAPEUTIC EXERCISES: CPT

## 2022-08-23 PROCEDURE — 97530 THERAPEUTIC ACTIVITIES: CPT

## 2022-08-23 NOTE — PROGRESS NOTES
Physical Therapy        Outpatient Physical Therapy           [place]           [] Phone: [phone]   Fax: [phone]  [place]           [] Phone: [phone]   Fax: [phone]      To: Santiago Carson*     From: Wilmer Ramírez, PT   Patient: Daniel Magallon                    : 1990  Diagnosis:  Sprain of unspecified site of right knee, initial encounter [P04.44BN]        Treatment Diagnosis:     R knee pain, impaired function  Date: 2022  [x]  Progress Note                []  Discharge Note    Evaluation Date:  22   Total Visits to date:   5 Cancels/No-shows to date:  0    Subjective:    Cindy Cruz arrives to therapy stating that the knee is okay currently no pain. Has increased pain at the end of the day up to a 4/10. Still can't kneel on that knee. The cumulative walking is what causes the increase in her knee pain. At this time, feels that she is improving w/physical therapy and would like to continue. Plan of Care/Treatment to date:  [x] Therapeutic Exercise    [x] Modalities:  [x] Therapeutic Activity     [x] Ultrasound  [x] Electrical Stimulation  [x] Gait Training      [] Cervical Traction   [] Lumbar Traction  [x] Neuromuscular Re-education  [] Cold/hotpack [] Iontophoresis  [x] Instruction in HEP      Other:  [x] Manual Therapy       [x]  Vasopneumatic  [] Aquatic Therapy       [x]   Dry Needle Therapy                      Objective/Significant Findings At Last Visit/Comments:       R knee AROM 0-136 deg, slight anterior knee stretch feeling     R LE MMT  Hip 4+/5  Knee 4 to 4+/5     Lacking endurance and stability R knee for single leg and balance activities       Assessment:     Patient has been seen x 5/10 approved physical therapy sessions. Diagnosis:   Sprain of unspecified site of right knee, initial encounter [X43.30JE]  Date of Injury/Surgery: 22  Treatment Diagnosis:  R knee pain, impaired function  Goals nearly met.   Pt will benefit from continued

## 2022-08-23 NOTE — FLOWSHEET NOTE
Outpatient Physical Therapy  [place]           [x] Phone: [phone]   Fax: [phone]  [place]           [] Phone: [phone]   Fax: [phone]        Physical Therapy Daily Treatment Note  Date:  2022    Patient Name:  Dre Fontaine    :  1990  MRN: [id number]  Restrictions/Precautions: No data recorded   Position Activity Restriction  Other position/activity restrictions: No formal restrictions  Diagnosis:   Sprain of unspecified site of right knee, initial encounter Radha Callum  Date of Injury/Surgery: 22  Treatment Diagnosis:  R knee pain, impaired function  Insurance/Certification information: [facility] 10 visits no end date   Referring Physician:  FRANCIS Jimenez   PCP: Meryle Jakes, MD  Next Doctor Visit:  Unknown  Plan of care signed (Y/N):  pending, Re-sent 22  Outcome Measure: KOOS 32%, 22 11% disability  Visit# / total visits:   4/10  Pain level: 0/10       Goals:     Patient goals: To have no knee pain return to normal activities/increase activity w/o pain     Long Term Goals  Time Frame for Long Term Goals: In 10 visits, patient will  demonstrate compliance and independence w/mobility and symptom management. - ONGOING 22  score at <= 10% disability on KOOS as indication of improved function w/daily activities. - NEARLY MET 22  increase RLE strength to 5/5 for improved function w/wbing activities. - IMPROVED/UNMET 22  demonstrate full R knee AROM w/o restriction or pain. Summary of Evaluation:  Assessment: Pt is a 33 y.o. patient who reports twisting her knee while using a antonino lift at work back in April. Per pt, there has been some improvement, but still gives her pain. WORSE:  Standing/walking for a long period, swells by night time, kneeling. BETTER:  In am and when wearing brace. Pain and swelling are intermittent. PLOF:  No knee issues, independent w/all mobility, self-care, work w/o problem.   CURRENT OF LOF:  R knee pain worsening through the day, swells, difficult to walk/stand for long periods of time. Patient will benefit from physical therapy to address deficits limiting mobility and function. Subjective:    Jennifer Lopez arrives to therapy stating that the knee is okay currently no pain. Has increased pain at the end of the day up to a 4/10. Still can't kneel on that knee. The cumulative walking is what causes the increase in her knee pain. At this time, feels that she is improving w/physical therapy and would like to continue. Any changes in Ambulatory Summary Sheet? None        Objective:  COVID screening questions were asked and patient attested that there had been no contact or symptoms    R knee AROM 0-136 deg, slight anterior knee stretch feeling    R LE MMT  Hip 4+/5  Knee 4 to 4+/5    Lacking endurance and stability R knee for single leg and balance activities    Exercises: (No more than 4 columns)   Exercise/Equipment Date  8/5/22 #2 Date  8/10/22 #3 8/18/2022 #4 8/23/22  #5/10         PROGRESS NOTE  GOAL ASSESSED   WARM UP       Rec Bike S13   5' 5'          TABLE       *Quad sets 10x1 10x1 -    *SLR 10x1 2x10 -    *Hip add yan 10x1 2x10 -                     STANDING       1/2 foam heel raises 10x2 10x2 2*15 2*15   Calf stretch    2*30\" (in between raises and after) 2*30\" (in between raises and after)   Heel tap 4\" 10x1 both sides 4\" 10x1 both sides 6\" 2*10 ea    Step up, down fwd, hip ER to step and return ER to floor 10x1 each direction right. 10x1 each direction right. --    Hip cybex  37.5#, hip abd. 10x1 42.5# , hip abd. 2x10  -- 42.5# , hip abd.    2x10   Shuttle leg press 4C, dbl. 10x3   3C, R / L  single,  4C, dbl. 10x3   3C, R / L  single,  3C 2*10 ea LE SL    FM lateral monster step 17#, handle, x 5 each side 17#, handle, x 5 each side 17# handle 5* ea  17# handle 5* ea    Sled push/pull        Squat med ball push out               Anterior step up with opposite knee tap 10# MB 6\" step 2*10 10# MB 6\" step 2*10   Sled push-pull   +25# 3 laps      fitter        1' F/B static - 2 reps  1' S/S static - 1 reps   PROPRIOCEPTION       Foam tandem 2x30\" each direction. 2x30\" each direction. --    Lunges to BOSU   Ant/Lat 10* ea LE ea dir     BOSU lateral step up/over   10* ea dir     SLB with ball catch    10* small green ball            MODALITIES prn       Game ready yes 10' 10' No              Other Therapeutic Activities/Education:    POC and treatment rationale/progression expected reviewed w/patient. Home Exercise Program:    See above (*)    Manual Treatments:    NO    Modalities:      Communication with other providers:    POC faxed 7/23/22, PN 8/23/22    Assessment:   End session pain: 0/10    Patient has been seen x 5/10 approved physical therapy sessions. Diagnosis:   Sprain of unspecified site of right knee, initial encounter [S83.91XA]  Date of Injury/Surgery: 4/19/22  Treatment Diagnosis:  R knee pain, impaired function  Goals nearly met. Pt will benefit from continued therapy to meet goals and return to PLOF.     Plan for Next Session:   Specific Instructions for Next Treatment: STM, KT tape for PF syndrome, quad/VMO strengthening, modalities prn, HEP    Time In / Time Out:    1707/0936     If [facility] Please Indicate Time In/Out/Total Time   (10 visits approved/no end date)  CPT Code Time in Time out Total Time   80812         82761 TE 1345 1400 15'   93161 NMR  1400 1425 25'   98493  1425 1433 8'   60539 Vaso                    Total for session    48'       Timed Code/Total Treatment Minutes:  48'/48'  TE 15' (1), NRE 25' (2), TA 8' (1)    Next Progress Note due:  9/23/22      Plan of Care Interventions:  [x] Therapeutic Exercise  [x] Modalities:  [x] Therapeutic Activity     [x] Ultrasound  [x] Estim  [] Gait Training      [] Cervical Traction [] Lumbar Traction  [x] Neuromuscular Re-education    [x] Cold/hotpack [] Iontophoresis   [x] Instruction in HEP      [x] Vasopneumatic   [] Dry Needling    [x] Manual Therapy               [] Aquatic Therapy              Electronically signed by:  Stefano Mack, ANA  8/23/2022, 1:47 PM

## 2022-08-26 ENCOUNTER — HOSPITAL ENCOUNTER (OUTPATIENT)
Dept: PHYSICAL THERAPY | Age: 32
Setting detail: THERAPIES SERIES
Discharge: HOME | End: 2022-08-26
Payer: COMMERCIAL

## 2022-08-26 PROCEDURE — 97110 THERAPEUTIC EXERCISES: CPT

## 2022-08-26 PROCEDURE — 97112 NEUROMUSCULAR REEDUCATION: CPT

## 2022-08-26 NOTE — FLOWSHEET NOTE
Outpatient Physical Therapy  [place]           [x] Phone: [phone]   Fax: [phone]  [place]           [] Phone: [phone]   Fax: [phone]        Physical Therapy Daily Treatment Note  Date:  2022    Patient Name:  Britney Verdugo    :  1990  MRN: [id number]  Restrictions/Precautions: No data recorded   Position Activity Restriction  Other position/activity restrictions: No formal restrictions  Diagnosis:   Sprain of unspecified site of right knee, initial encounter Yolanda Wilkes  Date of Injury/Surgery: 22  Treatment Diagnosis:  R knee pain, impaired function  Insurance/Certification information: [facility] 10 visits no end date   Referring Physician:  FRANCIS Garcia   PCP: Pedro Luis Martin MD  Next Doctor Visit:  Unknown  Plan of care signed (Y/N):  pending, Re-sent 22  Outcome Measure: KOOS 32%, 22 11% disability  Visit# / total visits:   5/10  Pain level: 1/10       Goals:     Patient goals: To have no knee pain return to normal activities/increase activity w/o pain     Long Term Goals  Time Frame for Long Term Goals: In 10 visits, patient will  demonstrate compliance and independence w/mobility and symptom management. - ONGOING 22  score at <= 10% disability on KOOS as indication of improved function w/daily activities. - NEARLY MET 22  increase RLE strength to 5/5 for improved function w/wbing activities. - IMPROVED/UNMET 22  demonstrate full R knee AROM w/o restriction or pain. Summary of Evaluation:  Assessment: Pt is a 33 y.o. patient who reports twisting her knee while using a antonino lift at work back in April. Per pt, there has been some improvement, but still gives her pain. WORSE:  Standing/walking for a long period, swells by night time, kneeling. BETTER:  In am and when wearing brace. Pain and swelling are intermittent. PLOF:  No knee issues, independent w/all mobility, self-care, work w/o problem.   CURRENT OF LOF:  R knee pain worsening through the day, swells, difficult to walk/stand for long periods of time. Patient will benefit from physical therapy to address deficits limiting mobility and function. Subjective:  Ruthy Chapman arrives to therapy stating that the knee is a little more sore than normal, tweaked her knee on the wobble board going s/s last session and has been a little sore since. Any changes in Ambulatory Summary Sheet? None        Objective:  COVID screening questions were asked and patient attested that there had been no contact or symptoms    Improved stability and endurance with step up with opp knee touch. Exercises: (No more than 4 columns) STAIRS  Exercise/Equipment 8/18/2022 #4 8/23/22  #5/10 8/26/2022 #6/10       PROGRESS NOTE  GOAL ASSESSED    WARM UP      Rec Bike S13 5' 5' 5'         TABLE                     STANDING      1/2 foam heel raises 2*15 2*15 2*15   Calf stretch  2*30\" (in between raises and after) 2*30\" (in between raises and after) 2*30\"    Heel tap 6\" 2*10 ea  --   Hip cybex  -- 42.5# , hip abd. 2x10 42.5# 2*10   Shuttle leg press 3C 2*10 ea LE SL  3C SL 2*10 ea    FM lateral monster step 17# handle 5* ea  17# handle 5* ea  17# handle 5* ea          Anterior step up with opposite knee tap  10# MB 6\" step 2*10 10# MB 6\" step 2*10 10# MB 6\" 2*10   Sled push-pull +25# 3 laps       fitter      1' F/B static - 2 reps  1' S/S static - 1 reps --   PROPRIOCEPTION      Lunges to BOSU Ant/Lat 10* ea LE ea dir   Ant/Lat 10* ea LE, ea dir    BOSU lateral step up/over 10* ea dir   10* ea dir    SLB with ball catch  10* small green ball   No ball toss          MODALITIES prn      Game ready 10' No              Other Therapeutic Activities/Education:    POC and treatment rationale/progression expected reviewed w/patient.     Home Exercise Program:    See above (*)    Manual Treatments:    NO    Modalities:      Communication with other providers:    POC faxed 7/23/22, PN 8/23/22    Assessment:   Angie tolerated today's session well. She reports no increase in pain during or after session. She is getting stronger and showing improvements in stability of the knee during more dynamic tasks.  End session pain: 0/10    Plan for Next Session:   Specific Instructions for Next Treatment: STM, KT tape for PF syndrome, quad/VMO strengthening, modalities prn, HEP    Time In / Time Out:    2021/6013     If [facility] Please Indicate Time In/Out/Total Time   (10 visits approved/no end date)  CPT Code Time in Time out Total Time   63335         18389 TE 0924 0940 16'   11638 [address] [facility] 10'   22731       10626 Vaso                    Total for session    26'       Timed Code/Total Treatment Minutes:  26' 1 NR 10 1 TE 12'    Next Progress Note due:  9/23/22      Plan of Care Interventions:  [x] Therapeutic Exercise  [x] Modalities:  [x] Therapeutic Activity     [x] Ultrasound  [x] Estim  [] Gait Training      [] Cervical Traction [] Lumbar Traction  [x] Neuromuscular Re-education    [x] Cold/hotpack [] Iontophoresis   [x] Instruction in HEP      [x] Vasopneumatic   [] Dry Needling    [x] Manual Therapy               [] Aquatic Therapy              Electronically signed by:  Connor Cabral PTA     8/26/2022, 6:51 AM

## 2022-09-01 ENCOUNTER — HOSPITAL ENCOUNTER (OUTPATIENT)
Dept: PHYSICAL THERAPY | Age: 32
Discharge: HOME | End: 2022-09-01

## 2022-09-01 NOTE — FLOWSHEET NOTE
Physical Therapy  Cancellation/No-show Note  Patient Name:  Delmy Uriostegui  :  1990   Date:  2022  Cancelled visits to date: 1  No-shows to date: 0    For today's appointment patient:  [x]  Cancelled  []  Rescheduled appointment  []  No-show     Reason given by patient:  []  Patient ill  []  Conflicting appointment  []  No transportation    []  Conflict with work  []  No reason given  [x]  Other:     Comments: overslept, unable to make it     Electronically signed by:  Patrick Brock PTA    9:39 AM  2022

## 2022-09-15 ENCOUNTER — HOSPITAL ENCOUNTER (OUTPATIENT)
Dept: PHYSICAL THERAPY | Age: 32
Setting detail: THERAPIES SERIES
Discharge: HOME | End: 2022-09-15
Payer: COMMERCIAL

## 2022-09-15 PROCEDURE — 97110 THERAPEUTIC EXERCISES: CPT

## 2022-09-15 NOTE — FLOWSHEET NOTE
Outpatient Physical Therapy  [place]           [x] Phone: [phone]   Fax: [phone]  [place]           [] Phone: [phone]   Fax: [phone]        Physical Therapy Daily Treatment Note  Date:  9/15/2022    Patient Name:  Prateek Gamez    :  1990  MRN: [id number]  Restrictions/Precautions: No data recorded   Position Activity Restriction  Other position/activity restrictions: No formal restrictions  Diagnosis:   Sprain of unspecified site of right knee, initial encounter [employer]  Date of Injury/Surgery: 22  Treatment Diagnosis:  R knee pain, impaired function  Insurance/Certification information: [facility] 10 visits no end date   Referring Physician:  FRANCIS Dejesus   PCP: Lisa Sawant MD  Next Doctor Visit:  Unknown  Plan of care signed (Y/N):  pending, Re-sent 22  Outcome Measure: KOOS 32%, 22 11% disability  Visit# / total visits:   7/10  Pain level: 0/10       Goals:     Patient goals: To have no knee pain return to normal activities/increase activity w/o pain     Long Term Goals  Time Frame for Long Term Goals: In 10 visits, patient will  demonstrate compliance and independence w/mobility and symptom management. - MET 9/15/22  score at <= 10% disability on KOOS as indication of improved function w/daily activities. - MET 9/15/22  increase RLE strength to 5/5 for improved function w/wbing activities. - MET 9/15/22  demonstrate full R knee AROM w/o restriction or pain. - MET 9/15/22         Summary of Evaluation:  Assessment: Pt is a 33 y.o. patient who reports twisting her knee while using a antonino lift at work back in April. Per pt, there has been some improvement, but still gives her pain. WORSE:  Standing/walking for a long period, swells by night time, kneeling. BETTER:  In am and when wearing brace. Pain and swelling are intermittent. PLOF:  No knee issues, independent w/all mobility, self-care, work w/o problem.   CURRENT OF LOF: R knee pain worsening through the day, swells, difficult to walk/stand for long periods of time. Patient will benefit from physical therapy to address deficits limiting mobility and function. Subjective:    Highest pain level after work can be a 3/10, improves w/ice. Antoinette Valentin feels her knee is healed and can manage with HEP. Antoinette Valentin feels the stability exercises were so beneficial she ordered foam on [employer]. Returns to [employer] tomorrow for F/U. Performing HEP and going to the gym, performing similar exercises as below. Any changes in Ambulatory Summary Sheet? None        Objective:  COVID screening questions were asked and patient attested that there had been no contact or symptoms    MMT R knee 5/5  ROM 0-135  KOOS score = 7% disability    Exercises: (No more than 4 columns) STAIRS  Exercise/Equipment 8/18/2022 #4 8/23/22  #5/10 8/26/2022 #6/10 9/15/22 #7/10       PROGRESS NOTE  GOAL ASSESSED  DISCHARGE   WARM UP       Rec Bike S13 5' 5' 5' 5' LV 4          TABLE       Quad set, SLF, hip add w/ball    1 x 10 ea             STANDING       1/2 foam heel raises 2*15 2*15 2*15 2*15   Calf stretch  2*30\" (in between raises and after) 2*30\" (in between raises and after) 2*30\"     Heel tap 6\" 2*10 ea  --    Hip cybex  -- 42.5# , hip abd.    2x10 42.5# 2*10    Shuttle leg press 3C 2*10 ea LE SL  3C SL 2*10 ea  3C SL 2*10 ea    FM lateral monster step 17# handle 5* ea  17# handle 5* ea  17# handle 5* ea  17# handle 5* ea          Anterior step up with opposite knee tap  10# MB 6\" step 2*10 10# MB 6\" step 2*10 10# MB 6\" 2*10 10# MB 6\" 2*10   Sled push-pull +25# 3 laps        fitter      1' F/B static - 2 reps  1' S/S static - 1 reps --    PROPRIOCEPTION       Lunges to BOSU Ant/Lat 10* ea LE ea dir   Ant/Lat 10* ea LE, ea dir  Ant/Lat 10* ea LE, ea dir    BOSU lateral step up/over 10* ea dir   10* ea dir     SLB with ball catch  10* small green ball   No ball toss            MODALITIES prn       Game ready 10' No                Other Therapeutic Activities/Education:    POC and treatment rationale/progression expected reviewed w/patient. Home Exercise Program:    See above (*)    Manual Treatments:    NO    Modalities:      Communication with other providers:    POC faxed 7/23/22, PN 8/23/22, Discharge faxed 9/15/22    Assessment:       Patient has been seen in physical therapy x 7 visits. Diagnosis:   Sprain of unspecified site of right knee, initial encounter [S83.91XA]  Date of Injury/Surgery: 4/19/22  Treatment Diagnosis:  R knee pain, impaired function  Skip Teixeira has regained full strength and ROM. KOOS score = 7% disability (WAS 32% at eval)  Goals Met  Discharge.     End session pain: 0/10    Plan for Next Session:   Specific Instructions for Next Treatment: STM, KT tape for PF syndrome, quad/VMO strengthening, modalities prn, HEP    Time In / Time Out:    9412/9623     If [facility] Please Indicate Time In/Out/Total Time   (10 visits approved/no end date)  CPT Code Time in Time out Total Time   85603         92615 TE 0902 0937 35'   85838 NMR       10366       80674 Vaso                    Total for session    35'       Timed Code/Total Treatment Minutes:  35'/35'  TE 35' (2)    Next Progress Note due:  Discharge      Plan of Care Interventions:  [x] Therapeutic Exercise  [x] Modalities:  [x] Therapeutic Activity     [x] Ultrasound  [x] Estim  [] Gait Training      [] Cervical Traction [] Lumbar Traction  [x] Neuromuscular Re-education    [x] Cold/hotpack [] Iontophoresis   [x] Instruction in HEP      [x] Vasopneumatic   [] Dry Needling    [x] Manual Therapy               [] Aquatic Therapy              Electronically signed by:  Christal Vaca, ANA     9/15/2022, 9:02 AM

## 2022-09-16 NOTE — DISCHARGE SUMMARY
Outpatient Physical Therapy           [place]           [] Phone: [phone]   Fax: [phone]  [place]           [] Phone: [phone]   Fax: [phone]        PHYSICAL THERAPY ADDENDUM FOR NUMBER OF VISITS CORRECTION    To: JUS Sarkar     From: Benetta Schwab, PT   Patient: Romy Knight                    : 1990  Diagnosis:  Sprain of unspecified site of right knee, initial encounter [I26.94KY]        Treatment Diagnosis:   R knee pain, impaired function    Date: 2022  []  Progress Note                [x]  Discharge Note    Evaluation Date:  22   Total Visits to date:   7 Cancels/No-shows to date:  1 CX    Subjective:    Highest pain level after work can be a 3/10, improves w/ice. Ruthy Chapman feels her knee is healed and can manage with HEP. Ruthy Chapman feels the stability exercises were so beneficial she ordered foam on [employer]. Returns to [employer] tomorrow for F/U. Performing HEP and going to the gym, performing similar exercises as below. Plan of Care/Treatment to date:  [x] Therapeutic Exercise    [x] Modalities:  [x] Therapeutic Activity     [] Ultrasound  [] Electrical Stimulation  [] Gait Training      [] Cervical Traction   [] Lumbar Traction  [x] Neuromuscular Re-education  [] Cold/hotpack [] Iontophoresis  [x] Instruction in HEP      Other:  [x] Manual Therapy       [x]  Vasopneumatic  [] Aquatic Therapy       []   Dry Needle Therapy                      Objective/Significant Findings At Last Visit/Comments:    MMT R knee 5/5  ROM 0-135  KOOS score = 7% disability    Assessment:     Patient has been seen in physical therapy x 7 visits. Diagnosis:   Sprain of unspecified site of right knee, initial encounter [U81.57WO]  Date of Injury/Surgery: 22  Treatment Diagnosis:  R knee pain, impaired function  Ruthy Chapman has regained full strength and ROM. KOOS score = 7% disability (WAS 32% at eval)  Goals Met  Discharge.     Goal Status:  [x] Achieved []

## 2023-02-23 ENCOUNTER — HOSPITAL ENCOUNTER (EMERGENCY)
Age: 33
Discharge: HOME | End: 2023-02-23
Payer: COMMERCIAL

## 2023-02-23 VITALS
HEART RATE: 89 BPM | TEMPERATURE: 97.5 F | WEIGHT: 245 LBS | OXYGEN SATURATION: 98 % | BODY MASS INDEX: 43.41 KG/M2 | RESPIRATION RATE: 15 BRPM | HEIGHT: 63 IN | SYSTOLIC BLOOD PRESSURE: 144 MMHG | DIASTOLIC BLOOD PRESSURE: 87 MMHG

## 2023-02-23 DIAGNOSIS — S06.0X0A BRAIN CONCUSSION, WITHOUT LOSS OF CONSCIOUSNESS, INITIAL ENCOUNTER: Primary | ICD-10-CM

## 2023-02-23 PROCEDURE — 99282 EMERGENCY DEPT VISIT SF MDM: CPT

## 2023-02-23 NOTE — ED NOTES
Patient educated on after visit care needs and instructions. Verbalized understanding and denies other needs. Saad 65  Anyi Sawant  02/23/23 9198

## 2023-02-23 NOTE — ED PROVIDER NOTES
**ADVANCED PRACTICE PROVIDER, I HAVE EVALUATED THIS PATIENT**        [address] [facility]      Pt Name: Mason Marin  KNX:[id number]  [place] 1990  Date of evaluation: 2/23/2023  Provider: Ralph Santana CNP      Chief Complaint:    Chief Complaint   Patient presents with    Head Injury     States that she flipped a rolling chair backwards at work and struck her head. Denies LOC, no blood thinners         Nursing Notes, Past Medical Hx, Past Surgical Hx, Social Hx, Allergies, and Family Hx were all reviewed and agreed with or any disagreements were addressed in the HPI.    HPI: (Location, Duration, Timing, Severity, Quality, Assoc Sx, Context, Modifying factors)    Chief Complaint of head injury    This is a  29 y.o. female who is an employee of [employer] presents with head injury. Patient was pushing a wheelchair and slid and fell backwards. Patient hit her back of head on the concrete floor. Patient stood up by herself. She did not loss consciousness. Patient reported some headache in the front and back of the head. No neck pain. No weakness of extremities. No nausea and vomiting. Patient is not on blood thinner.     PastMedical/Surgical History:      Diagnosis Date    Anxiety     Anxiety     Infertility associated with anovulation     Irregular menses     Obesity     PCOS (polycystic ovarian syndrome)     PCOS (polycystic ovarian syndrome)          Procedure Laterality Date    HAND SURGERY         Medications:  Previous Medications    CHOLECALCIFEROL (VITAMIN D) 50 MCG (2000 UT) CAPS CAPSULE    Take by mouth    CLOMIPHENE (CLOMID) 50 MG TABLET    Take 1 tablet by mouth daily On days 3-7 of cycle    MEDROXYPROGESTERONE (PROVERA) 10 MG TABLET    Take 1 tablet by mouth daily for 10 days    SERTRALINE (ZOLOFT) 100 MG TABLET    Take 100 mg by mouth daily         Review of Systems:  (2-9 systems needed)  Review of Systems   All other systems reviewed and are negative. \"Positives and Pertinent negatives as per HPI\"    Physical Exam:  Physical Exam  HENT:      Head: Normocephalic. Comments: A small hematoma with a size of 1 x 1 cm on back of scalp     Nose: Nose normal.      Mouth/Throat:      Mouth: Mucous membranes are moist.   Eyes:      Pupils: Pupils are equal, round, and reactive to light. Cardiovascular:      Rate and Rhythm: Normal rate and regular rhythm. Pulmonary:      Effort: Pulmonary effort is normal.   Abdominal:      General: Abdomen is flat. Musculoskeletal:         General: Normal range of motion. Cervical back: Normal range of motion. Skin:     General: Skin is warm. Capillary Refill: Capillary refill takes less than 2 seconds. Neurological:      General: No focal deficit present. Mental Status: She is alert and oriented to person, place, and time. Cranial Nerves: No cranial nerve deficit. Sensory: No sensory deficit. Motor: No weakness. Gait: Gait normal.   Psychiatric:         Mood and Affect: Mood normal.         Vitals:    Vitals:    02/23/23 0919   BP: (!) 158/93   Pulse: 87   Resp: 16   Temp: 97.7 °F (36.5 °C)   TempSrc: Oral   SpO2: 96%   Weight: 245 lb (111.1 kg)   Height: 5' 3\" (1.6 m)       LABS:Labs Reviewed - No data to display     Remainder of labs reviewed and were negative at this time or not returned at the time of this note. RADIOLOGY:   Non-plain film images such as CT, Ultrasound and MRI are read by the radiologist. GREGORIO Carrillo CNP have directly visualized the radiologic plain film image(s) with the below findings:      Interpretation per the Radiologist below, if available at the time of this note:    No orders to display        No results found. MEDICAL DECISION MAKING / ED COURSE:     29-year-old female presents with a head injury. The patient fell from standing position backwards.   Patient hit her head on the concrete floor. Patient did not lost her consciousness. She stood up by herself. No nausea or vomiting. No vision change. Physical exam: A small hematoma in the back of scalp, no focal neurology deficit. According to [place] CT head/trauma rule, patient's score is 0. Not a candidate for head CT. Patient may have mild concussion. Patient needed good rest.  She can be discharged home with concussion precaution. Patient will return to ER immediately for persistent headache, nausea vomiting, weakness of extremities, seizure activity, or any other concerns. PROCEDURES:   Procedures    None    Patient was given:  Medications - No data to display      The patient tolerated their visit well. I evaluated the patient. The physician was available for consultation as needed. The patient and / or the family were informed of the results of any tests, a time was given to answer questions, a plan was proposed and they agreed with plan. CLINICAL IMPRESSION:  1. Brain concussion, without loss of consciousness, initial encounter        DISPOSITION Decision To Discharge 02/23/2023 09:32:42 AM      PATIENT REFERRED TO:  No follow-up provider specified.     DISCHARGE MEDICATIONS:  New Prescriptions    No medications on file       DISCONTINUED MEDICATIONS:  Discontinued Medications    No medications on file              (Please note the MDM and HPI sections of this note were completed with a voice recognition program.  Efforts were made to edit the dictations but occasionally words are mis-transcribed.)    Electronically signed, Velasquez Irizarry APRN - NOMI  02/23/23 8433

## 2023-02-23 NOTE — DISCHARGE INSTRUCTIONS
Good rest, return to ER immediately if you have a persistent or worsening headache, nausea vomiting, weakness of extremities, seizure activity, or any other concerns.

## 2023-02-23 NOTE — Clinical Note
Al Apollo was seen and treated in our emergency department on 2/23/2023. She may return to work on 02/25/2023. If you have any questions or concerns, please don't hesitate to call.       GREGORIO Ramirez - CNP

## 2023-03-09 SDOH — ECONOMIC STABILITY: FOOD INSECURITY: WITHIN THE PAST 12 MONTHS, THE FOOD YOU BOUGHT JUST DIDN'T LAST AND YOU DIDN'T HAVE MONEY TO GET MORE.: NEVER TRUE

## 2023-03-09 SDOH — ECONOMIC STABILITY: TRANSPORTATION INSECURITY
IN THE PAST 12 MONTHS, HAS LACK OF TRANSPORTATION KEPT YOU FROM MEETINGS, WORK, OR FROM GETTING THINGS NEEDED FOR DAILY LIVING?: NO

## 2023-03-09 SDOH — ECONOMIC STABILITY: FOOD INSECURITY: WITHIN THE PAST 12 MONTHS, YOU WORRIED THAT YOUR FOOD WOULD RUN OUT BEFORE YOU GOT MONEY TO BUY MORE.: NEVER TRUE

## 2023-03-09 SDOH — ECONOMIC STABILITY: INCOME INSECURITY: HOW HARD IS IT FOR YOU TO PAY FOR THE VERY BASICS LIKE FOOD, HOUSING, MEDICAL CARE, AND HEATING?: NOT HARD AT ALL

## 2023-03-09 SDOH — ECONOMIC STABILITY: HOUSING INSECURITY
IN THE LAST 12 MONTHS, WAS THERE A TIME WHEN YOU DID NOT HAVE A STEADY PLACE TO SLEEP OR SLEPT IN A SHELTER (INCLUDING NOW)?: NO

## 2023-03-10 ENCOUNTER — OFFICE VISIT (OUTPATIENT)
Dept: OBGYN | Age: 33
End: 2023-03-10

## 2023-03-10 VITALS
BODY MASS INDEX: 45.89 KG/M2 | SYSTOLIC BLOOD PRESSURE: 128 MMHG | DIASTOLIC BLOOD PRESSURE: 83 MMHG | HEIGHT: 63 IN | WEIGHT: 259 LBS

## 2023-03-10 DIAGNOSIS — E28.2 PCOS (POLYCYSTIC OVARIAN SYNDROME): ICD-10-CM

## 2023-03-10 DIAGNOSIS — Z01.419 WOMEN'S ANNUAL ROUTINE GYNECOLOGICAL EXAMINATION: Primary | ICD-10-CM

## 2023-03-10 DIAGNOSIS — N97.0 INFERTILITY ASSOCIATED WITH ANOVULATION: ICD-10-CM

## 2023-03-10 DIAGNOSIS — E66.01 MORBID OBESITY (HCC): ICD-10-CM

## 2023-03-10 RX ORDER — MEDROXYPROGESTERONE ACETATE 10 MG/1
10 TABLET ORAL DAILY
Qty: 30 TABLET | Refills: 3 | Status: SHIPPED | OUTPATIENT
Start: 2023-03-10 | End: 2023-03-20

## 2023-03-10 RX ORDER — ERGOCALCIFEROL (VITAMIN D2) 10 MCG
1 TABLET ORAL DAILY
Qty: 30 TABLET | Refills: 11 | Status: SHIPPED | OUTPATIENT
Start: 2023-03-10

## 2023-03-10 SDOH — ECONOMIC STABILITY: FOOD INSECURITY: WITHIN THE PAST 12 MONTHS, YOU WORRIED THAT YOUR FOOD WOULD RUN OUT BEFORE YOU GOT MONEY TO BUY MORE.: NEVER TRUE

## 2023-03-10 SDOH — ECONOMIC STABILITY: FOOD INSECURITY: WITHIN THE PAST 12 MONTHS, THE FOOD YOU BOUGHT JUST DIDN'T LAST AND YOU DIDN'T HAVE MONEY TO GET MORE.: NEVER TRUE

## 2023-03-10 SDOH — ECONOMIC STABILITY: TRANSPORTATION INSECURITY
IN THE PAST 12 MONTHS, HAS THE LACK OF TRANSPORTATION KEPT YOU FROM MEDICAL APPOINTMENTS OR FROM GETTING MEDICATIONS?: NO

## 2023-03-10 ASSESSMENT — ENCOUNTER SYMPTOMS
VOMITING: 0
RESPIRATORY NEGATIVE: 1
DIARRHEA: 0
NAUSEA: 0
GASTROINTESTINAL NEGATIVE: 1
SHORTNESS OF BREATH: 0
ABDOMINAL PAIN: 0
CONSTIPATION: 0

## 2023-03-10 ASSESSMENT — SOCIAL DETERMINANTS OF HEALTH (SDOH): HOW HARD IS IT FOR YOU TO PAY FOR THE VERY BASICS LIKE FOOD, HOUSING, MEDICAL CARE, AND HEATING?: NOT HARD AT ALL

## 2023-03-10 ASSESSMENT — PATIENT HEALTH QUESTIONNAIRE - PHQ9
SUM OF ALL RESPONSES TO PHQ QUESTIONS 1-9: 0
2. FEELING DOWN, DEPRESSED OR HOPELESS: 0
SUM OF ALL RESPONSES TO PHQ QUESTIONS 1-9: 0
SUM OF ALL RESPONSES TO PHQ9 QUESTIONS 1 & 2: 0
1. LITTLE INTEREST OR PLEASURE IN DOING THINGS: 0

## 2023-03-10 NOTE — PROGRESS NOTES
3/10/23    Delmy Uriostegui  1990    Chief Complaint   Patient presents with    Gynecologic Exam     Annual exam, irregular menses, is sexually active, Last pap smear was 9/29/2021 normal.     Irregular Menses     no menses since 1/2022, Pt states she does have irregular spotting if she strains or lifts something heavy. She has the spotting for only and hour or two and its dark red/ brown. Pt was on provera to induce a cycle and then on clomid to try to conceive at last annual. Pt would christina to discuss restarting that process. The patient is a 29 y.o. female, Quique Pope who presents for her annual exam.  She is menstruating abnormally. She is  sexually active. She is not currently taking birth control    She reports no gynecological symptoms. Pap smear history: Her last PAP smear was in 2021.   Her results were normal.    Past Medical History:   Diagnosis Date    Anxiety     Anxiety     Infertility associated with anovulation     Irregular menses     Obesity     PCOS (polycystic ovarian syndrome)     PCOS (polycystic ovarian syndrome)        Past Surgical History:   Procedure Laterality Date    HAND SURGERY         Family History   Problem Relation Age of Onset    Breast Cancer Paternal Grandmother     Hypertension Father        Social History     Tobacco Use    Smoking status: Every Day     Packs/day: 0.50     Types: Cigarettes    Smokeless tobacco: Never   Vaping Use    Vaping Use: Never used   Substance Use Topics    Alcohol use: Yes     Comment: social    Drug use: No       Current Outpatient Medications   Medication Sig Dispense Refill    medroxyPROGESTERone (PROVERA) 10 MG tablet Take 1 tablet by mouth daily for 10 days 30 tablet 3    clomiPHENE (CLOMID) 50 MG tablet Take 1 tablet by mouth daily On days 3-7 of cycle 15 tablet 2    Prenatal Vit-Fe Fumarate-FA (PRENATAL ONE DAILY) 27-0.8 MG TABS Take 1 tablet by mouth daily 30 tablet 11    sertraline (ZOLOFT) 100 MG tablet Take 100 mg by mouth daily Cholecalciferol (VITAMIN D) 50 MCG (2000) CAPS capsule Take by mouth       No current facility-administered medications for this visit. Allergies   Allergen Reactions    Bactrim [Sulfamethoxazole-Trimethoprim] Hives           Immunization History   Administered Date(s) Administered    COVID-19, PFIZER PURPLE top, DILUTE for use, (age 15 y+), 30mcg/0.3mL 2021, 2021, 2021       Review of Systems   Constitutional: Negative. Negative for fatigue. Respiratory: Negative. Negative for shortness of breath. Gastrointestinal: Negative. Negative for abdominal pain, constipation, diarrhea, nausea and vomiting. Genitourinary: Negative. Neurological:  Negative for dizziness. Psychiatric/Behavioral: Negative. /83 (Site: Left Upper Arm, Position: Sitting, Cuff Size: Large Adult)   Ht 5' 3\" (1.6 m)   Wt 259 lb (117.5 kg)   BMI 45.88 kg/m²     Physical Exam  Vitals and nursing note reviewed. Constitutional:       Appearance: Normal appearance. She is obese. HENT:      Head: Normocephalic. Pulmonary:      Effort: Pulmonary effort is normal. No respiratory distress. Chest:   Breasts:     Right: Normal.      Left: Normal.   Abdominal:      Palpations: Abdomen is soft. Tenderness: There is no abdominal tenderness. Genitourinary:     General: Normal vulva. Exam position: Lithotomy position. Vagina: Normal.      Cervix: Normal.      Uterus: Normal.       Adnexa: Right adnexa normal and left adnexa normal.      Rectum: Normal.   Musculoskeletal:         General: Normal range of motion. Cervical back: Normal and normal range of motion. Lumbar back: Normal.   Lymphadenopathy:      Cervical: No cervical adenopathy. Upper Body:      Right upper body: No supraclavicular or axillary adenopathy. Left upper body: No supraclavicular or axillary adenopathy. Lower Body: No right inguinal adenopathy. No left inguinal adenopathy.   Skin:     General: Skin is warm and dry.   Neurological:      General: No focal deficit present.      Mental Status: She is alert and oriented to person, place, and time.   Psychiatric:         Attention and Perception: Attention normal.         Mood and Affect: Mood normal.         Speech: Speech normal.         Behavior: Behavior is cooperative.         Cognition and Memory: Cognition normal.         Judgment: Judgment normal.       No results found for this visit on 03/10/23.    Assessment and Plan   Diagnosis Orders   1. Women's annual routine gynecological examination        2. Infertility associated with anovulation        3. PCOS (polycystic ovarian syndrome)        4. Morbid obesity (HCC)          Start PNV and FH Pro, partner to start super antioxidants      No follow-ups on file.    Dot Goode, APRN - CNP

## 2023-03-30 RX ORDER — LETROZOLE 2.5 MG/1
TABLET, FILM COATED ORAL
Qty: 5 TABLET | Refills: 3 | Status: SHIPPED | OUTPATIENT
Start: 2023-03-30

## 2025-01-21 SDOH — ECONOMIC STABILITY: FOOD INSECURITY: WITHIN THE PAST 12 MONTHS, THE FOOD YOU BOUGHT JUST DIDN'T LAST AND YOU DIDN'T HAVE MONEY TO GET MORE.: NEVER TRUE

## 2025-01-21 SDOH — ECONOMIC STABILITY: INCOME INSECURITY: IN THE LAST 12 MONTHS, WAS THERE A TIME WHEN YOU WERE NOT ABLE TO PAY THE MORTGAGE OR RENT ON TIME?: NO

## 2025-01-21 SDOH — ECONOMIC STABILITY: FOOD INSECURITY: WITHIN THE PAST 12 MONTHS, YOU WORRIED THAT YOUR FOOD WOULD RUN OUT BEFORE YOU GOT MONEY TO BUY MORE.: NEVER TRUE

## 2025-01-22 ENCOUNTER — OFFICE VISIT (OUTPATIENT)
Dept: OBGYN | Age: 35
End: 2025-01-22
Payer: COMMERCIAL

## 2025-01-22 ENCOUNTER — HOSPITAL ENCOUNTER (OUTPATIENT)
Age: 35
Setting detail: SPECIMEN
Discharge: HOME | End: 2025-01-22
Payer: COMMERCIAL

## 2025-01-22 VITALS
HEIGHT: 63 IN | WEIGHT: 267 LBS | HEART RATE: 102 BPM | SYSTOLIC BLOOD PRESSURE: 137 MMHG | BODY MASS INDEX: 47.31 KG/M2 | DIASTOLIC BLOOD PRESSURE: 87 MMHG

## 2025-01-22 DIAGNOSIS — E66.01 MORBID OBESITY: ICD-10-CM

## 2025-01-22 DIAGNOSIS — N92.6 IRREGULAR MENSES: ICD-10-CM

## 2025-01-22 DIAGNOSIS — L72.3 SEBACEOUS CYST: ICD-10-CM

## 2025-01-22 DIAGNOSIS — Z01.419 WOMEN'S ANNUAL ROUTINE GYNECOLOGICAL EXAMINATION: Primary | ICD-10-CM

## 2025-01-22 DIAGNOSIS — N90.89 LABIAL IRRITATION: ICD-10-CM

## 2025-01-22 PROCEDURE — 87624 HPV HI-RISK TYP POOLED RSLT: CPT

## 2025-01-22 PROCEDURE — 99395 PREV VISIT EST AGE 18-39: CPT | Performed by: NURSE PRACTITIONER

## 2025-01-22 PROCEDURE — G0123 SCREEN CERV/VAG THIN LAYER: HCPCS

## 2025-01-22 RX ORDER — CLOTRIMAZOLE AND BETAMETHASONE DIPROPIONATE 10; .64 MG/G; MG/G
CREAM TOPICAL
Qty: 45 G | Refills: 0 | Status: SHIPPED | OUTPATIENT
Start: 2025-01-22

## 2025-01-22 ASSESSMENT — ENCOUNTER SYMPTOMS
CONSTIPATION: 0
RESPIRATORY NEGATIVE: 1
VOMITING: 0
SHORTNESS OF BREATH: 0
GASTROINTESTINAL NEGATIVE: 1
NAUSEA: 0
DIARRHEA: 0
ABDOMINAL PAIN: 0

## 2025-01-22 ASSESSMENT — PATIENT HEALTH QUESTIONNAIRE - PHQ9
SUM OF ALL RESPONSES TO PHQ QUESTIONS 1-9: 0
SUM OF ALL RESPONSES TO PHQ QUESTIONS 1-9: 0
SUM OF ALL RESPONSES TO PHQ9 QUESTIONS 1 & 2: 0
1. LITTLE INTEREST OR PLEASURE IN DOING THINGS: NOT AT ALL
2. FEELING DOWN, DEPRESSED OR HOPELESS: NOT AT ALL
SUM OF ALL RESPONSES TO PHQ QUESTIONS 1-9: 0
SUM OF ALL RESPONSES TO PHQ QUESTIONS 1-9: 0

## 2025-01-22 NOTE — PROGRESS NOTES
25    Angie LENIN Jeffery  1990    Chief Complaint   Patient presents with    Annual Exam     Annual exam. Smoker  No known h/o dvt.  No LMP recorded. (Menstrual status: Other - See Notes)..Periods are irregular.Patient is sexually active. Patient is not on birth control. Pap Smear was  2021 and results were negativeHPV negative. Patient complains of right labia itching x few wks.          The patient is a 35 y.o. female,  who presents for her annual exam.  She  menses irregular .  She is  sexually active. She is not currently taking birth control    She reports additional symptoms of vulvar lesion.      Pap smear history: Her last PAP smear was in .  Her results were normal.    Past Medical History:   Diagnosis Date    Anxiety     Anxiety     Infertility associated with anovulation     Irregular menses     Obesity     PCOS (polycystic ovarian syndrome)     PCOS (polycystic ovarian syndrome)        Past Surgical History:   Procedure Laterality Date    HAND SURGERY         Family History   Problem Relation Age of Onset    Breast Cancer Paternal Grandmother     Hypertension Father        Social History     Tobacco Use    Smoking status: Every Day     Current packs/day: 0.50     Average packs/day: 0.5 packs/day for 12.1 years (6.0 ttl pk-yrs)     Types: Cigarettes     Start date:     Smokeless tobacco: Never   Vaping Use    Vaping status: Never Used   Substance Use Topics    Alcohol use: Yes     Comment: social    Drug use: Yes     Types: Marijuana (Weed)     Comment: sometimes       Current Outpatient Medications   Medication Sig Dispense Refill    sertraline (ZOLOFT) 100 MG tablet Take 1 tablet by mouth daily      Cholecalciferol (VITAMIN D) 50 MCG ( [place]) CAPS capsule Take by mouth      clotrimazole-betamethasone (LOTRISONE) 1-0.05 % cream Apply topically 2 times daily. 45 g 0     No current facility-administered medications for this visit.       Allergies   Allergen Reactions    Bactrim

## 2025-01-24 LAB
COMMENT: NORMAL
HPV OTHER HR TYPES: NOT DETECTED
HPV TYPE 16: NOT DETECTED
HPV TYPE 18: NOT DETECTED

## 2025-01-27 LAB — GYNECOLOGY CYTOLOGY REPORT: NORMAL
